# Patient Record
Sex: MALE | URBAN - METROPOLITAN AREA
[De-identification: names, ages, dates, MRNs, and addresses within clinical notes are randomized per-mention and may not be internally consistent; named-entity substitution may affect disease eponyms.]

---

## 2021-12-01 VITALS
RESPIRATION RATE: 16 BRPM | BODY MASS INDEX: 20.94 KG/M2 | HEIGHT: 73 IN | TEMPERATURE: 98.7 F | HEART RATE: 87 BPM | WEIGHT: 158 LBS | DIASTOLIC BLOOD PRESSURE: 105 MMHG | OXYGEN SATURATION: 100 % | SYSTOLIC BLOOD PRESSURE: 139 MMHG

## 2021-12-01 ASSESSMENT — MIFFLIN-ST. JEOR: SCORE: 1630.56

## 2021-12-02 ENCOUNTER — HOSPITAL ENCOUNTER (EMERGENCY)
Facility: CLINIC | Age: 51
Discharge: HOME | End: 2021-12-02

## 2021-12-02 NOTE — ED TRIAGE NOTES
Pt presents for evaluation of left sided facial swelling. Then tonight, noted painful swallowing. Feel like there may be a bump inside the mouth. Pain moves in to ear. Denies any known dental issues, foul taste or smell in the mouth.